# Patient Record
Sex: MALE
[De-identification: names, ages, dates, MRNs, and addresses within clinical notes are randomized per-mention and may not be internally consistent; named-entity substitution may affect disease eponyms.]

---

## 2020-04-27 ENCOUNTER — HOSPITAL ENCOUNTER (EMERGENCY)
Dept: HOSPITAL 88 - FSED | Age: 25
Discharge: HOME | End: 2020-04-27
Payer: COMMERCIAL

## 2020-04-27 VITALS — HEIGHT: 70 IN | BODY MASS INDEX: 41.23 KG/M2 | WEIGHT: 288 LBS

## 2020-04-27 DIAGNOSIS — R07.89: Primary | ICD-10-CM

## 2020-04-27 DIAGNOSIS — I10: ICD-10-CM

## 2020-04-27 DIAGNOSIS — L80: ICD-10-CM

## 2020-04-27 DIAGNOSIS — K21.9: ICD-10-CM

## 2020-04-27 DIAGNOSIS — K22.4: ICD-10-CM

## 2020-04-27 PROCEDURE — 71046 X-RAY EXAM CHEST 2 VIEWS: CPT

## 2020-04-27 PROCEDURE — 99283 EMERGENCY DEPT VISIT LOW MDM: CPT

## 2020-04-27 PROCEDURE — 93005 ELECTROCARDIOGRAM TRACING: CPT

## 2020-04-27 NOTE — DIAGNOSTIC IMAGING REPORT
EXAMINATION:  CXR 2 VIEW - [facility]    



INDICATION: Pleuritic cp



COMPARISON: None     



FINDINGS:  PA and lateral views



TUBES and LINES:  None.        



LUNGS/PLEURA:  Lungs are well inflated.  There is no evidence of pneumonia or

pulmonary edema.. There is no pleural effusion or pneumothorax.



HEART AND MEDIASTINUM:  The cardiomediastinal silhouette is unremarkable.    



BONES AND SOFT TISSUES:  No acute osseous lesion.  Soft tissues are

unremarkable.



UPPER ABDOMEN: No free air under the diaphragm.    



IMPRESSION: 

No acute thoracic abnormality.





Signed by: Vince Crenshaw MD on 4/27/2020 7:46 PM

## 2022-05-20 ENCOUNTER — HOSPITAL ENCOUNTER (EMERGENCY)
Dept: HOSPITAL 88 - FSED | Age: 27
Discharge: HOME | End: 2022-05-20
Payer: SELF-PAY

## 2022-05-20 VITALS — WEIGHT: 315 LBS | BODY MASS INDEX: 45.1 KG/M2 | HEIGHT: 70 IN

## 2022-05-20 VITALS — SYSTOLIC BLOOD PRESSURE: 145 MMHG | DIASTOLIC BLOOD PRESSURE: 92 MMHG

## 2022-05-20 DIAGNOSIS — E66.01: ICD-10-CM

## 2022-05-20 DIAGNOSIS — R03.0: ICD-10-CM

## 2022-05-20 DIAGNOSIS — R07.89: Primary | ICD-10-CM

## 2022-05-20 DIAGNOSIS — E11.65: ICD-10-CM

## 2022-05-20 PROCEDURE — 85025 COMPLETE CBC W/AUTO DIFF WBC: CPT

## 2022-05-20 PROCEDURE — 71046 X-RAY EXAM CHEST 2 VIEWS: CPT

## 2022-05-20 PROCEDURE — 80053 COMPREHEN METABOLIC PANEL: CPT

## 2022-05-20 PROCEDURE — 82553 CREATINE MB FRACTION: CPT

## 2022-05-20 PROCEDURE — 84484 ASSAY OF TROPONIN QUANT: CPT

## 2022-05-20 PROCEDURE — 93005 ELECTROCARDIOGRAM TRACING: CPT

## 2022-05-20 PROCEDURE — 99284 EMERGENCY DEPT VISIT MOD MDM: CPT
